# Patient Record
Sex: MALE | Race: WHITE
[De-identification: names, ages, dates, MRNs, and addresses within clinical notes are randomized per-mention and may not be internally consistent; named-entity substitution may affect disease eponyms.]

---

## 2018-02-03 ENCOUNTER — HOSPITAL ENCOUNTER (EMERGENCY)
Dept: HOSPITAL 36 - ER | Age: 28
LOS: 1 days | Discharge: HOME | End: 2018-02-04
Payer: MEDICAID

## 2018-02-03 DIAGNOSIS — K80.80: Primary | ICD-10-CM

## 2018-02-03 LAB
ALBUMIN SERPL-MCNC: 4.4 GM/DL (ref 4.2–5.5)
ALBUMIN/GLOB SERPL: 1.6 {RATIO} (ref 1–1.8)
ALP SERPL-CCNC: 69 U/L (ref 34–104)
ALT SERPL-CCNC: 30 U/L (ref 7–52)
AMPHET UR-MCNC: NEGATIVE NG/ML
ANION GAP SERPL CALC-SCNC: 10.3 MMOL/L (ref 7–16)
APPEARANCE UR: CLEAR
AST SERPL-CCNC: 23 U/L (ref 13–39)
BACTERIA #/AREA URNS HPF: (no result) /HPF
BARBITURATES UR-MCNC: NEGATIVE UG/ML
BASOPHILS # BLD AUTO: 0 TH/CUMM (ref 0–0.2)
BASOPHILS NFR BLD AUTO: 0.1 % (ref 0–2)
BENZODIAZEPINES PNL UR: NEGATIVE
BILIRUB SERPL-MCNC: 0.3 MG/DL (ref 0.3–1)
BILIRUB UR-MCNC: NEGATIVE MG/DL
BUN SERPL-MCNC: 23 MG/DL (ref 7–25)
CALCIUM SERPL-MCNC: 9.1 MG/DL (ref 8.6–10.3)
CANNABINOIDS SERPL QL CFM: NEGATIVE
CHLORIDE SERPL-SCNC: 105 MEQ/L (ref 98–107)
CO2 SERPL-SCNC: 26.4 MEQ/L (ref 21–31)
COCAINE METAB.OTHER UR-MCNC: NEGATIVE NG/ML
COLOR UR: YELLOW
CREAT SERPL-MCNC: 0.9 MG/DL (ref 0.7–1.3)
EOSINOPHIL # BLD AUTO: 0.2 TH/CMM (ref 0.1–0.4)
EOSINOPHIL NFR BLD AUTO: 1.6 % (ref 0–5)
EPI CELLS URNS QL MICRO: (no result) /LPF
ERYTHROCYTE [DISTWIDTH] IN BLOOD BY AUTOMATED COUNT: 13.7 % (ref 11.5–20)
GLOBULIN SER-MCNC: 2.8 GM/DL
GLUCOSE SERPL-MCNC: 111 MG/DL (ref 70–105)
GLUCOSE UR STRIP-MCNC: NEGATIVE MG/DL
HCT VFR BLD CALC: 43.9 % (ref 41–60)
HGB BLD-MCNC: 14.7 GM/DL (ref 12–16)
KETONES UR STRIP-MCNC: NEGATIVE MG/DL
LEUKOCYTE ESTERASE UR-ACNC: NEGATIVE
LYMPHOCYTE AB SER FC-ACNC: 2.7 TH/CMM (ref 1.5–3)
LYMPHOCYTES NFR BLD AUTO: 26.4 % (ref 20–50)
MCH RBC QN AUTO: 28.1 PG (ref 26–30)
MCHC RBC AUTO-ENTMCNC: 33.6 PG (ref 28–36)
MCV RBC AUTO: 83.8 FL (ref 80–99)
METHADONE UR CFM-MCNC: NEGATIVE NG/ML
METHAMPHET UR QL: NEGATIVE
MICRO URNS: YES
MONOCYTES # BLD AUTO: 0.8 TH/CMM (ref 0.3–1)
MONOCYTES NFR BLD AUTO: 7.6 % (ref 2–10)
NEUTROPHILS # BLD: 6.7 TH/CMM (ref 1.8–8)
NEUTROPHILS NFR BLD AUTO: 64.3 % (ref 40–80)
NITRITE UR QL STRIP: NEGATIVE
OPIATES UR QL: NEGATIVE
PCP UR-MCNC: NEGATIVE UG/L
PH UR STRIP: 6 [PH] (ref 4.6–8)
PLATELET # BLD: 310 TH/CMM (ref 150–400)
PMV BLD AUTO: 7.8 FL
POTASSIUM SERPL-SCNC: 3.7 MEQ/L (ref 3.5–5.1)
PROT UR STRIP-MCNC: NEGATIVE MG/DL
RBC # BLD AUTO: 5.25 MIL/CMM (ref 4.3–5.7)
RBC # UR STRIP: NEGATIVE /UL
RBC #/AREA URNS HPF: (no result) /HPF (ref 0–5)
SODIUM SERPL-SCNC: 138 MEQ/L (ref 136–145)
SP GR UR STRIP: 1.02 (ref 1–1.03)
TRICYCLICS UR QL: NEGATIVE
URINALYSIS COMPLETE PNL UR: (no result)
UROBILINOGEN UR STRIP-ACNC: 0.2 E.U./DL (ref 0.2–1)
WBC # BLD AUTO: 10.4 TH/CMM (ref 4.8–10.8)
WBC #/AREA URNS HPF: (no result) /HPF (ref 0–5)

## 2018-02-03 PROCEDURE — 36415 COLL VENOUS BLD VENIPUNCTURE: CPT

## 2018-02-03 PROCEDURE — 85025 COMPLETE CBC W/AUTO DIFF WBC: CPT

## 2018-02-03 PROCEDURE — 81001 URINALYSIS AUTO W/SCOPE: CPT

## 2018-02-03 PROCEDURE — 80053 COMPREHEN METABOLIC PANEL: CPT

## 2018-02-03 PROCEDURE — 99285 EMERGENCY DEPT VISIT HI MDM: CPT

## 2018-02-03 PROCEDURE — 74177 CT ABD & PELVIS W/CONTRAST: CPT

## 2018-02-03 PROCEDURE — 80307 DRUG TEST PRSMV CHEM ANLYZR: CPT

## 2018-02-03 PROCEDURE — 83036 HEMOGLOBIN GLYCOSYLATED A1C: CPT

## 2018-02-03 PROCEDURE — 96375 TX/PRO/DX INJ NEW DRUG ADDON: CPT

## 2018-02-03 PROCEDURE — 84484 ASSAY OF TROPONIN QUANT: CPT

## 2018-02-03 PROCEDURE — 96365 THER/PROPH/DIAG IV INF INIT: CPT

## 2018-02-03 PROCEDURE — 82150 ASSAY OF AMYLASE: CPT

## 2018-02-04 LAB
HBA1C MFR BLD: 5.6 % (ref 4–6)
HGB BLD-MCNC: 14 G/DL (ref 4–35)

## 2018-02-04 NOTE — DIAGNOSTIC IMAGING REPORT
Exam: CT examination of the pelvis



HISTORY: Abdominal pain



Total DLP equals 994



CTDI equals 18.1



Findings:



Multiple contiguous thin section of the abdomen pelvis obtained from

lower thorax to pubic symphysis with administration intravenous contrast

material no prior studies available comparison.



The study demonstrates normal appearance of the liver and spleen.



The pancreas is intact.  The gallbladder is distended, there is evidence

for cholelithiasis..  The kidneys concentrate and excrete contrast

material normal fashion.



No free fluid is noted.  The appendix is intact.  The bowel gas to be

nonspecific.  No abnormal adenopathy is noted.



The urinary bladder is intact.  There is no evidence of diverticular

disease of diverticulitis.



There is evidence for a large amount of fecal content most likely in the

distal ileum.



Bony structures demonstrate no evidence for lytic or blastic changes.



IMPRESSION:



Cholelithiasis, distended gallbladder



Large amount of fecal content in the distal small bowel most likely

distal ileum clinical correlation recommended.

## 2018-02-04 NOTE — ED PHYSICIAN CHART
ED Chief Complaint/HPI





- Patient Information


Date Seen:: 02/04/18


Time Seen:: 00:18


Chief Complaint:: ABDOMINAL PAIN


History of Present Illness:: 





THIS IS A 26 YO OBESE MALE WITH GENERALIZE ABDOMINAL PAIN AND NAUSEA BUT NO 

VOMITING.  HE DENIES HAVING ANY PREVIOUS SURGERY AND DENIES HAVING RENAL 

STONES. HE HAS NOT ANY SURGERY IN THE PAST. HE DENIES HAVING ANY FEVER OR 

PAINFUL URINATION.


Allergies:: 


 Allergies











Allergy/AdvReac Type Severity Reaction Status Date / Time


 


No Known Allergies Allergy   Verified 02/03/18 22:21











Vitals:: 


 Vital Signs - 8 hr











  02/03/18





  21:30


 


Temp 98.3 F


 


HR 79


 


RR 17


 


/87


 


O2 Sat % 96











Historian:: Patient


Review:: Nurse's Note Reviewed





ED Review of Systems





- Review of Systems


General/Constitutional: No fever, No chills, No weight loss, No weakness, No 

diaphoresis, No edema, No loss of appetite


Skin: No skin lesions, No rash, No bruising


Head: No headache, No light-headedness


Eyes: No loss of vision, No pain, No diplopia


ENT: No earache, No nasal drainage, No sore throat, No tinnitus


Neck: No neck pain, No swelling, No thyromegaly, No stiffness, No mass noted


Cardio Vascular: No chest pain, No palpitations, No PND, No orthopnea, No edema


Pulmonary: No SOB, No cough, No sputum, No wheezing


GI: Nausea, No vomiting, No diarrhea, Pain (GENERALIZED), No melena, No 

hematochezia, No constipation, No hematemesis


G/U: No dysuria, No frequency, No hematuria


Musculoskeletal: No bone or joint pain, No back pain, No muscle pain


Endocrine: No polyuria, No polydipsia


Psychiatric: No prior psych history, No depression, No anxiety, No suicidal 

ideation


Hematopoietic: No bruising, No lymphadenopathy


Allergic/Immuno: No urticaria, No angioedema


Neurological: No syncope, No focal symptoms, No weakness, No paresthesia, No 

headache, No seizure, No dizziness, No confusion, No vertigo





ED Past Medical History





- Past Medical History


Obtainable: Yes


Past Medical History: No significant medical hx


Family History: None


Social History: Non Smoker, No Alcohol, No Drug Use, Employed


Surgical History: None


Psychiatricy History: None


Medication: Reviewed





Family Medical History





- Family Member


  ** Mother


History Unknown: Yes





ED Physical Exam





- Physical Examination


General/Constitutional: Awake, Well-developed, well-nourished, Alert, No 

distress, GCS 15, Non-toxic appearing, Ambulatory


Head: Atraumatic


Eyes: Lids, conjuctiva normal, PERRL, EOMI


Skin: Nl inspection, No rash, No skin lesions, No ecchymosis, Well hydrated, No 

lymphadenopathy


ENMT: External ears, nose nl, Nasal exam nl, Lips, teeth, gums nl


Neck: Nontender, Full ROM w/o pain, No JVD, No nuchal rigidity, No bruit, No 

mass, No stridor


Respiratory: Nl effort/Exclusion, Clear to Auscultation, No Wheeze/Rhonchi/Rales


Cardio Vascular: RRR, No murmur, gallop, rubs, NL S1 S2


GI: No tenderness/rebounding/guarding (GENERALIZE TENDERNESS WITH REBOUND), No 

organomegaly, No hernia, Normal BS's, Nondistended, No mass/bruits, No McBurney 

tenderness


: No CVA tenderness


Extremities: No tenderness or effusion, Full ROM, normal strength in all 

extremities, No edema, Normal digits & nails


Neuro/Psych: Alert/oriented, DTR's symmetric, Normal sensory exam, Normal motor 

strength, Judgement/insight normal, Mood normal, Normal gait, No focal deficits


Misc: Normal back, No paraspinal tenderness





ED Labs/Radiology/EKG Results





- Lab Results


Results: 


 Laboratory Tests











  02/03/18 02/03/18 02/03/18





  21:27 21:27 21:27


 


WBC  10.4  


 


RBC  5.25  


 


Hgb  14.7  


 


Hct  43.9  


 


MCV  83.8  


 


MCH  28.1  


 


MCHC Differential  33.6  


 


RDW  13.7  


 


Plt Count  310  


 


MPV  7.8  


 


Neutrophils %  64.3  


 


Lymphocytes %  26.4  


 


Monocytes %  7.6  


 


Eosinophils %  1.6  


 


Basophils %  0.1  


 


Sodium   138 


 


Potassium   3.7 


 


Chloride   105 


 


Carbon Dioxide   26.4 


 


Anion Gap   10.3 


 


BUN   23 


 


Creatinine   0.9 


 


Est GFR ( Amer)   > 60.0 


 


Est GFR (Non-Af Amer)   > 60.0 


 


BUN/Creatinine Ratio   25.6 


 


Glucose   111 H 


 


Calcium   9.1 


 


Total Bilirubin   0.3 


 


AST   23 


 


ALT   30 


 


Alkaline Phosphatase   69 


 


Troponin I    < 0.01 L


 


Total Protein   7.2 


 


Albumin   4.4 


 


Globulin   2.8 


 


Albumin/Globulin Ratio   1.6 


 


Amylase   


 


Urine Source   


 


Urine Color   


 


Urine Clarity   


 


Urine pH   


 


Ur Specific Gravity   


 


Urine Protein   


 


Urine Glucose (UA)   


 


Urine Ketones   


 


Urine Blood   


 


Urine Nitrate   


 


Urine Bilirubin   


 


Urine Urobilinogen   


 


Ur Leukocyte Esterase   


 


Urine RBC   


 


Urine WBC   


 


Ur Epithelial Cells   


 


Urine Bacteria   


 


Urine Opiates Screen   


 


Urine Methadone Screen   


 


Ur Barbiturates Screen   


 


Ur Tricyclics Screen   


 


Ur Phencyclidine Scrn   


 


Amphetamines Screen   


 


U Methamphetamines Scrn   


 


U Benzodiazepines Scrn   


 


U Cocaine Metab Screen   


 


U Cannabinoids Screen   














  02/03/18 02/03/18 02/03/18





  21:57 22:00 22:00


 


WBC   


 


RBC   


 


Hgb   


 


Hct   


 


MCV   


 


MCH   


 


MCHC Differential   


 


RDW   


 


Plt Count   


 


MPV   


 


Neutrophils %   


 


Lymphocytes %   


 


Monocytes %   


 


Eosinophils %   


 


Basophils %   


 


Sodium   


 


Potassium   


 


Chloride   


 


Carbon Dioxide   


 


Anion Gap   


 


BUN   


 


Creatinine   


 


Est GFR ( Amer)   


 


Est GFR (Non-Af Amer)   


 


BUN/Creatinine Ratio   


 


Glucose   


 


Calcium   


 


Total Bilirubin   


 


AST   


 


ALT   


 


Alkaline Phosphatase   


 


Troponin I   


 


Total Protein   


 


Albumin   


 


Globulin   


 


Albumin/Globulin Ratio   


 


Amylase  30  


 


Urine Source   CLEAN C 


 


Urine Color   YELLOW 


 


Urine Clarity   CLEAR 


 


Urine pH   6.0 


 


Ur Specific Gravity   1.025 


 


Urine Protein   NEGATIVE 


 


Urine Glucose (UA)   NEGATIVE 


 


Urine Ketones   NEGATIVE 


 


Urine Blood   NEGATIVE 


 


Urine Nitrate   NEGATIVE 


 


Urine Bilirubin   NEGATIVE 


 


Urine Urobilinogen   0.2 


 


Ur Leukocyte Esterase   NEGATIVE 


 


Urine RBC   0-2 H 


 


Urine WBC   0-2 


 


Ur Epithelial Cells   RARE 


 


Urine Bacteria   NONE SEEN 


 


Urine Opiates Screen    NEGATIVE


 


Urine Methadone Screen    NEGATIVE


 


Ur Barbiturates Screen    NEGATIVE


 


Ur Tricyclics Screen    NEGATIVE


 


Ur Phencyclidine Scrn    NEGATIVE


 


Amphetamines Screen    NEGATIVE


 


U Methamphetamines Scrn    NEGATIVE


 


U Benzodiazepines Scrn    NEGATIVE


 


U Cocaine Metab Screen    NEGATIVE


 


U Cannabinoids Screen    NEGATIVE














- Radiology Results


Results: 





CT SCAN =  GALLSTONES AND UMBILICAL HERNIA





ED Assessment





- Assessment


General Assessment: 





GALLSTONES





ED Septic Shock





- .


Is Septic Shock (SBP<90, OR Lactate>4 mmol\L) present?: No





- <6hrs of presentation:


Vital Signs: 


 Vital Signs - 8 hr











  02/03/18





  21:30


 


Temp 98.3 F


 


HR 79


 


RR 17


 


/87


 


O2 Sat % 96














ED Reassessment (Disposition)





- Reassessment


Reassessment Condition:: Improved





- Diagnosis


Diagnosis:: 





GALLSTONES





- Aftercare/Follow up Instructions


Aftercare/Follow-Up Instructions:: Counseled pt regarding lab results/diagnosis 

& need follow up, Refer to Discharge Instructions, Counseled pt & family 

regarding lab results/diagnosis & need follow up





- Patient Disposition


Discharge/Transfer:: Home


Condition at Disposition:: Improved





ED Discharge Plan





- Patient Disposition


Admit/Discharge/Transfer: PT DISCHARGED HOME


Condition at Disposition: Improved


Additional Instructions: 


SEE YOUR PMD FOR A SURGERY CONSULT TO HAVE THE GALLBLADDER REMOVED.

## 2018-02-27 ENCOUNTER — HOSPITAL ENCOUNTER (EMERGENCY)
Dept: HOSPITAL 36 - ER | Age: 28
Discharge: LEFT BEFORE DISCHARGE | End: 2018-02-27
Payer: MEDICAID

## 2018-02-27 ENCOUNTER — HOSPITAL ENCOUNTER (EMERGENCY)
Dept: HOSPITAL 36 - ER | Age: 28
Discharge: HOME | End: 2018-02-27
Payer: MEDICAID

## 2018-02-27 DIAGNOSIS — Z53.21: ICD-10-CM

## 2018-02-27 DIAGNOSIS — R10.9: Primary | ICD-10-CM

## 2018-02-27 LAB
ALBUMIN SERPL-MCNC: 4.2 GM/DL (ref 4.2–5.5)
ALBUMIN/GLOB SERPL: 1.6 {RATIO} (ref 1–1.8)
ALP SERPL-CCNC: 58 U/L (ref 34–104)
ALT SERPL-CCNC: 33 U/L (ref 7–52)
ANION GAP SERPL CALC-SCNC: 11.2 MMOL/L (ref 7–16)
AST SERPL-CCNC: 20 U/L (ref 13–39)
BASOPHILS # BLD AUTO: 0.1 TH/CUMM (ref 0–0.2)
BASOPHILS NFR BLD AUTO: 1.5 % (ref 0–2)
BILIRUB SERPL-MCNC: 0.3 MG/DL (ref 0.3–1)
BUN SERPL-MCNC: 26 MG/DL (ref 7–25)
CALCIUM SERPL-MCNC: 9.6 MG/DL (ref 8.6–10.3)
CHLORIDE SERPL-SCNC: 102 MEQ/L (ref 98–107)
CO2 SERPL-SCNC: 26.5 MEQ/L (ref 21–31)
CREAT SERPL-MCNC: 1.1 MG/DL (ref 0.7–1.3)
EOSINOPHIL # BLD AUTO: 0.3 TH/CMM (ref 0.1–0.4)
EOSINOPHIL NFR BLD AUTO: 3 % (ref 0–5)
ERYTHROCYTE [DISTWIDTH] IN BLOOD BY AUTOMATED COUNT: 13.8 % (ref 11.5–20)
GLOBULIN SER-MCNC: 2.7 GM/DL
GLUCOSE SERPL-MCNC: 149 MG/DL (ref 70–105)
HCT VFR BLD CALC: 42.6 % (ref 41–60)
HGB BLD-MCNC: 14.5 GM/DL (ref 12–16)
LYMPHOCYTE AB SER FC-ACNC: 2.7 TH/CMM (ref 1.5–3)
LYMPHOCYTES NFR BLD AUTO: 27.7 % (ref 20–50)
MCH RBC QN AUTO: 28.4 PG (ref 26–30)
MCHC RBC AUTO-ENTMCNC: 34 PG (ref 28–36)
MCV RBC AUTO: 83.5 FL (ref 80–99)
MONOCYTES # BLD AUTO: 0.8 TH/CMM (ref 0.3–1)
MONOCYTES NFR BLD AUTO: 7.9 % (ref 2–10)
NEUTROPHILS # BLD: 5.9 TH/CMM (ref 1.8–8)
NEUTROPHILS NFR BLD AUTO: 59.9 % (ref 40–80)
PLATELET # BLD: 315 TH/CMM (ref 150–400)
PMV BLD AUTO: 7.6 FL
POTASSIUM SERPL-SCNC: 3.7 MEQ/L (ref 3.5–5.1)
RBC # BLD AUTO: 5.1 MIL/CMM (ref 4.3–5.7)
SODIUM SERPL-SCNC: 136 MEQ/L (ref 136–145)
WBC # BLD AUTO: 9.8 TH/CMM (ref 4.8–10.8)

## 2018-02-27 PROCEDURE — C9113 INJ PANTOPRAZOLE SODIUM, VIA: HCPCS

## 2018-02-27 PROCEDURE — 96375 TX/PRO/DX INJ NEW DRUG ADDON: CPT

## 2018-02-27 PROCEDURE — 36415 COLL VENOUS BLD VENIPUNCTURE: CPT

## 2018-02-27 PROCEDURE — Z7502: HCPCS

## 2018-02-27 PROCEDURE — 99284 EMERGENCY DEPT VISIT MOD MDM: CPT

## 2018-02-27 PROCEDURE — 85025 COMPLETE CBC W/AUTO DIFF WBC: CPT

## 2018-02-27 PROCEDURE — 96365 THER/PROPH/DIAG IV INF INIT: CPT

## 2018-02-27 PROCEDURE — 80053 COMPREHEN METABOLIC PANEL: CPT
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
